# Patient Record
Sex: FEMALE | Race: WHITE | Employment: FULL TIME | URBAN - METROPOLITAN AREA
[De-identification: names, ages, dates, MRNs, and addresses within clinical notes are randomized per-mention and may not be internally consistent; named-entity substitution may affect disease eponyms.]

---

## 2020-11-17 ENCOUNTER — APPOINTMENT (OUTPATIENT)
Dept: GENERAL RADIOLOGY | Age: 40
End: 2020-11-17
Payer: COMMERCIAL

## 2020-11-17 LAB
ANION GAP SERPL CALCULATED.3IONS-SCNC: 13 MMOL/L (ref 3–16)
BASOPHILS ABSOLUTE: 0.1 K/UL (ref 0–0.2)
BASOPHILS RELATIVE PERCENT: 0.7 %
BUN BLDV-MCNC: 9 MG/DL (ref 7–20)
CALCIUM SERPL-MCNC: 9.4 MG/DL (ref 8.3–10.6)
CHLORIDE BLD-SCNC: 103 MMOL/L (ref 99–110)
CO2: 20 MMOL/L (ref 21–32)
CREAT SERPL-MCNC: 0.8 MG/DL (ref 0.6–1.1)
EOSINOPHILS ABSOLUTE: 0.1 K/UL (ref 0–0.6)
EOSINOPHILS RELATIVE PERCENT: 0.9 %
GFR AFRICAN AMERICAN: >60
GFR NON-AFRICAN AMERICAN: >60
GLUCOSE BLD-MCNC: 111 MG/DL (ref 70–99)
HCG QUALITATIVE: NEGATIVE
HCT VFR BLD CALC: 36.3 % (ref 36–48)
HEMOGLOBIN: 11.9 G/DL (ref 12–16)
LYMPHOCYTES ABSOLUTE: 3 K/UL (ref 1–5.1)
LYMPHOCYTES RELATIVE PERCENT: 27.5 %
MCH RBC QN AUTO: 26.4 PG (ref 26–34)
MCHC RBC AUTO-ENTMCNC: 32.8 G/DL (ref 31–36)
MCV RBC AUTO: 80.5 FL (ref 80–100)
MONOCYTES ABSOLUTE: 0.6 K/UL (ref 0–1.3)
MONOCYTES RELATIVE PERCENT: 5.2 %
NEUTROPHILS ABSOLUTE: 7.2 K/UL (ref 1.7–7.7)
NEUTROPHILS RELATIVE PERCENT: 65.7 %
PDW BLD-RTO: 13.9 % (ref 12.4–15.4)
PLATELET # BLD: 353 K/UL (ref 135–450)
PMV BLD AUTO: 7.2 FL (ref 5–10.5)
POTASSIUM SERPL-SCNC: 3.7 MMOL/L (ref 3.5–5.1)
PRO-BNP: 92 PG/ML (ref 0–124)
RBC # BLD: 4.51 M/UL (ref 4–5.2)
SODIUM BLD-SCNC: 136 MMOL/L (ref 136–145)
TROPONIN: <0.01 NG/ML
WBC # BLD: 10.9 K/UL (ref 4–11)

## 2020-11-17 PROCEDURE — 83880 ASSAY OF NATRIURETIC PEPTIDE: CPT

## 2020-11-17 PROCEDURE — 85025 COMPLETE CBC W/AUTO DIFF WBC: CPT

## 2020-11-17 PROCEDURE — 80048 BASIC METABOLIC PNL TOTAL CA: CPT

## 2020-11-17 PROCEDURE — 93005 ELECTROCARDIOGRAM TRACING: CPT

## 2020-11-17 PROCEDURE — 71046 X-RAY EXAM CHEST 2 VIEWS: CPT

## 2020-11-17 PROCEDURE — 84484 ASSAY OF TROPONIN QUANT: CPT

## 2020-11-17 PROCEDURE — 84703 CHORIONIC GONADOTROPIN ASSAY: CPT

## 2020-11-17 PROCEDURE — 99283 EMERGENCY DEPT VISIT LOW MDM: CPT

## 2020-11-17 RX ORDER — NORETHINDRONE AND ETHINYL ESTRADIOL 1 MG-35MCG
1 KIT ORAL DAILY
COMMUNITY

## 2020-11-17 ASSESSMENT — PAIN DESCRIPTION - ORIENTATION: ORIENTATION: LEFT

## 2020-11-17 ASSESSMENT — PAIN DESCRIPTION - PAIN TYPE: TYPE: ACUTE PAIN

## 2020-11-17 ASSESSMENT — PAIN SCALES - GENERAL: PAINLEVEL_OUTOF10: 2

## 2020-11-17 ASSESSMENT — PAIN DESCRIPTION - LOCATION: LOCATION: JAW;NECK

## 2020-11-18 ENCOUNTER — HOSPITAL ENCOUNTER (EMERGENCY)
Age: 40
Discharge: HOME | End: 2020-11-18
Payer: COMMERCIAL

## 2020-11-18 VITALS
HEIGHT: 66 IN | HEART RATE: 92 BPM | BODY MASS INDEX: 37.93 KG/M2 | SYSTOLIC BLOOD PRESSURE: 109 MMHG | WEIGHT: 236 LBS | OXYGEN SATURATION: 100 % | TEMPERATURE: 97.8 F | DIASTOLIC BLOOD PRESSURE: 65 MMHG | RESPIRATION RATE: 16 BRPM

## 2020-11-18 LAB
D DIMER: <200 NG/ML DDU (ref 0–229)
EKG ATRIAL RATE: 94 BPM
EKG DIAGNOSIS: NORMAL
EKG P AXIS: 39 DEGREES
EKG P-R INTERVAL: 130 MS
EKG Q-T INTERVAL: 332 MS
EKG QRS DURATION: 72 MS
EKG QTC CALCULATION (BAZETT): 415 MS
EKG R AXIS: 54 DEGREES
EKG T AXIS: 52 DEGREES
EKG VENTRICULAR RATE: 94 BPM

## 2020-11-18 PROCEDURE — 85379 FIBRIN DEGRADATION QUANT: CPT

## 2020-11-18 PROCEDURE — 93010 ELECTROCARDIOGRAM REPORT: CPT | Performed by: INTERNAL MEDICINE

## 2020-11-18 RX ORDER — CYCLOBENZAPRINE HCL 10 MG
10 TABLET ORAL 3 TIMES DAILY PRN
Qty: 20 TABLET | Refills: 0 | Status: SHIPPED | OUTPATIENT
Start: 2020-11-18 | End: 2020-11-25

## 2020-11-18 ASSESSMENT — ENCOUNTER SYMPTOMS
NAUSEA: 0
ABDOMINAL PAIN: 0
SHORTNESS OF BREATH: 0
VOMITING: 0

## 2020-11-18 ASSESSMENT — HEART SCORE: ECG: 0

## 2020-11-18 NOTE — ED PROVIDER NOTES
[address] [facility]        Pt Name: Michael Hudson  MRN: [id number]  [place] 1980  Date of evaluation: 11/17/2020  Provider: Tala Parra  PCP: No primary care provider on file. SELAM. I have evaluated this patient. My supervising physician was available for consultation. CHIEF COMPLAINT       Chief Complaint   Patient presents with    Shortness of Breath     intermittent SOB with left side neck, jaw and indigestion x3 days       HISTORY OF PRESENT ILLNESS   (Location, Timing/Onset, Context/Setting, Quality, Duration, Modifying Factors, Severity, Associated Signs and Symptoms)  Note limiting factors. Michael Hudson is a 47 y.o. female patient presents emergency department for evaluation of intermittent headache for the past few days. Patient states she typically has \"normal\" headaches. Patient states her headaches this time started in left-sided posterior neck, wrapped up around the top of the head and into her left-sided jaw. Patient states she became concerned with the jaw pain and this has never happened before. Patient denies any neck stiffness. Denies any fever or chills. Patient states she also felt as though she was slightly short of breath more with exertion than she typically is. Patient states she does not feel short of breath at rest.  Patient states she googled her symptoms and they told her she was possibly having a heart attack. Patient states she had also had some indigestion. Patient states she took Excedrin or ibuprofen for her headaches and it would get better and then would return a few hours later. Patient states she has also had some heavy periods recently and just started on birth control pills to help regulate this. Patient denies any leg swelling, pain or history of blood clot. No recent surgeries.     Nursing Notes were all reviewed and agreed with or any disagreements were addressed in the HPI.    REVIEW OF SYSTEMS    (2-9 systems for level 4, 10 or more for level 5)     Review of Systems   Constitutional: Negative for fatigue and fever. HENT: Negative. Eyes: Negative for visual disturbance. Respiratory: Negative for shortness of breath. Cardiovascular: Negative for chest pain. Gastrointestinal: Negative for abdominal pain, nausea and vomiting. Genitourinary: Negative. Musculoskeletal: Negative. Skin: Negative. Neurological: Positive for headaches. Positives and Pertinent negatives as per HPI. Except as noted above in the ROS, all other systems were reviewed and negative. PAST MEDICAL HISTORY   History reviewed. No pertinent past medical history. SURGICAL HISTORY   History reviewed. No pertinent surgical history. CURRENTMEDICATIONS       Previous Medications    NORETHINDRONE-ETHINYL ESTRADIOL (ALAYCEN 1/35) 1-35 MG-MCG PER TABLET    Take 1 tablet by mouth daily         ALLERGIES     Patient has no known allergies. FAMILYHISTORY     History reviewed. No pertinent family history. SOCIAL HISTORY       Social History     Tobacco Use    Smoking status: Not on file   Substance Use Topics    Alcohol use: Not on file    Drug use: Not on file       SCREENINGS      Heart Score for chest pain patients  History: Slightly Suspicious  ECG: Normal  Patient Age: < 45 years  *Risk factors for Atherosclerotic disease: Obesity  Risk Factors: 1 or 2 risk factors  Troponin: < 1X normal limit  Heart Score Total: 1      PHYSICAL EXAM    (up to 7 for level 4, 8 or more for level 5)     ED Triage Vitals [11/17/20 2114]   BP Temp Temp Source Pulse Resp SpO2 Height Weight   (!) 175/91 97.8 °F (36.6 °C) Infrared 92 16 97 % 5' 6\" (1.676 m) 236 lb (107 kg)       Physical Exam  Vitals signs and nursing note reviewed. Constitutional:       General: She is not in acute distress. Appearance: She is well-developed. She is not ill-appearing, toxic-appearing or diaphoretic. HENT:      Head: Normocephalic and atraumatic. Nose: Nose normal.   Eyes:      General:         Right eye: No discharge. Left eye: No discharge. Neck:      Musculoskeletal: Full passive range of motion without pain, normal range of motion and neck supple. Normal range of motion. No neck rigidity, pain with movement, spinous process tenderness or muscular tenderness. Meningeal: Brudzinski's sign and Kernig's sign absent. Cardiovascular:      Rate and Rhythm: Normal rate and regular rhythm. Heart sounds: Normal heart sounds. No murmur. No gallop. Pulmonary:      Effort: Pulmonary effort is normal. No respiratory distress. Breath sounds: Normal breath sounds. No decreased breath sounds, wheezing, rhonchi or rales. Chest:      Chest wall: No tenderness. Musculoskeletal: Normal range of motion. Skin:     General: Skin is warm and dry. Capillary Refill: Capillary refill takes less than 2 seconds. Coloration: Skin is not pale. Neurological:      General: No focal deficit present. Mental Status: She is alert and oriented to person, place, and time. GCS: GCS eye subscore is 4. GCS verbal subscore is 5. GCS motor subscore is 6. Cranial Nerves: Cranial nerves are intact. Sensory: Sensation is intact. Motor: Motor function is intact. Coordination: Coordination is intact.    Psychiatric:         Mood and Affect: Mood normal.         Behavior: Behavior normal.         DIAGNOSTIC RESULTS   LABS:    Labs Reviewed   BASIC METABOLIC PANEL - Abnormal; Notable for the following components:       Result Value    CO2 20 (*)     Glucose 111 (*)     All other components within normal limits    Narrative:     Performed at:  [facility] [address], [place], [address]   Phone [phone]   CBC WITH AUTO DIFFERENTIAL - Abnormal; Notable for the following components:    Hemoglobin 11.9 (*)     All other components N/A    CONSULTS:  None      EMERGENCY DEPARTMENT COURSE and DIFFERENTIAL DIAGNOSIS/MDM:   Vitals:    Vitals:    11/18/20 0300 11/18/20 0315 11/18/20 0330 11/18/20 0345   BP: 125/61  120/60    Pulse:       Resp:       Temp:       TempSrc:       SpO2: 100% 96% 100% 100%   Weight:       Height:           Patient was given the following medications:  Medications - No data to display        Patient presents emergency department for evaluation of headache with associated jaw pain. Patient is alert and oriented no acute distress. Vitals are stable and she is afebrile. Patient does not have any neck pain. She has full range of motion of her neck without stiffness or difficulty. She has no tenderness to her trapezius muscles however it does feel tense compared to the right. No lymphadenopathy appreciated. No cervical thoracic or lumbar spinal tenderness. Lungs are clear to auscultation bilaterally heart rate is regular without murmurs rubs or gallops. Abdomen is soft and nontender. Patient is low risk on heart score. Laboratory evaluation is unremarkable. Chest x-ray shows no acute cardiopulmonary process. Patient recently was started on oral birth control agents and D-dimer was negative. I suspect patient had a muscle tension based headache which was relieved with over-the-counter NSAIDs. A muscle relaxer will be added to her regimen and she was encouraged to return for any worsening symptoms. Patient has been asymptomatic while here in the emergency department. She has not had a headache the entire time she is here. She has not any shortness of breath, chest pain, jaw pain or indigestion. Patient is also encouraged to follow-up with primary care as needed. Patient's oxygen saturations are good.   I do not believe the patient's symptoms today are due to pulmonary embolism, pulmonary edema, pneumonia, pneumothorax, ACS, CHF, status asthmaticus, acute respiratory failure, profound anemia or metabolic abnormality, amongst other more emergent diagnostic considerations. Patient was advised to immediately return to emergency department if symptoms worsen. FINAL IMPRESSION      1. Acute nonintractable headache, unspecified headache type          DISPOSITION/PLAN   DISPOSITION Decision To Discharge 11/18/2020 03:45:26 AM      PATIENT REFERREDTO:  No follow-up provider specified.     DISCHARGE MEDICATIONS:  New Prescriptions    CYCLOBENZAPRINE (FLEXERIL) 10 MG TABLET    Take 1 tablet by mouth 3 times daily as needed for Muscle spasms       DISCONTINUED MEDICATIONS:  Discontinued Medications    No medications on file              (Please note that portions of this note were completed with a voice recognition program.  Efforts were made to edit the dictations but occasionally words are mis-transcribed.)    Lindsey Garrido PA-C (electronically signed)            Lindsey Garrido PA-C  11/18/20 1646 [address]  12/25/20 [id number]

## 2020-11-18 NOTE — ED NOTES
Nursing Discharge Notes:  -Patient discharged at this time in no acute distress after verbalizing understanding of discharge instructions.  -A copy of the AVS was reviewed with pt.  -Pt received applicable scripts which were reviewed with pt by this RN. -Pt was given the opportunity to ask questions before signing for discharge. -IV removed and dressing applied.    -Pt left ambulatory to lobby / discharge area. Patient Education:  Learner - Patient. Motivation and Readiness To Learn - Medium to High  Barriers To Learning - None  Learning Preference / Provided Instructions - Both written and verbal discharge instructions.        Tony Kimble RN  11/18/20 5616

## 2023-11-24 ENCOUNTER — OFFICE VISIT (OUTPATIENT)
Age: 43
End: 2023-11-24

## 2023-11-24 VITALS
HEART RATE: 95 BPM | TEMPERATURE: 98.4 F | SYSTOLIC BLOOD PRESSURE: 156 MMHG | DIASTOLIC BLOOD PRESSURE: 80 MMHG | OXYGEN SATURATION: 98 % | WEIGHT: 206 LBS | BODY MASS INDEX: 33.11 KG/M2 | HEIGHT: 66 IN | RESPIRATION RATE: 14 BRPM

## 2023-11-24 DIAGNOSIS — J20.9 ACUTE BRONCHITIS, UNSPECIFIED ORGANISM: Primary | ICD-10-CM

## 2023-11-24 RX ORDER — LEVOFLOXACIN 500 MG/1
500 TABLET, FILM COATED ORAL DAILY
Qty: 10 TABLET | Refills: 0 | Status: SHIPPED | OUTPATIENT
Start: 2023-11-24 | End: 2023-12-04

## 2023-11-24 RX ORDER — DEXTROMETHORPHAN HYDROBROMIDE AND PROMETHAZINE HYDROCHLORIDE 15; 6.25 MG/5ML; MG/5ML
5 SYRUP ORAL 4 TIMES DAILY PRN
Qty: 120 ML | Refills: 0 | Status: SHIPPED | OUTPATIENT
Start: 2023-11-24 | End: 2023-12-01

## 2023-11-24 RX ORDER — PREDNISONE 20 MG/1
20 TABLET ORAL 2 TIMES DAILY
Qty: 10 TABLET | Refills: 0 | Status: SHIPPED | OUTPATIENT
Start: 2023-11-24 | End: 2023-11-29

## 2023-11-24 RX ORDER — ALBUTEROL SULFATE 90 UG/1
2 AEROSOL, METERED RESPIRATORY (INHALATION) 4 TIMES DAILY PRN
Qty: 18 G | Refills: 0 | Status: SHIPPED | OUTPATIENT
Start: 2023-11-24

## 2023-11-24 ASSESSMENT — ENCOUNTER SYMPTOMS
COUGH: 1
SORE THROAT: 0
RHINORRHEA: 0
HEMOPTYSIS: 0
SHORTNESS OF BREATH: 0
HEARTBURN: 0
WHEEZING: 1